# Patient Record
Sex: MALE | Race: WHITE | NOT HISPANIC OR LATINO | URBAN - METROPOLITAN AREA
[De-identification: names, ages, dates, MRNs, and addresses within clinical notes are randomized per-mention and may not be internally consistent; named-entity substitution may affect disease eponyms.]

---

## 2024-11-10 ENCOUNTER — ANCILLARY PROCEDURE (OUTPATIENT)
Dept: URGENT CARE | Age: 39
End: 2024-11-10
Payer: COMMERCIAL

## 2024-11-10 ENCOUNTER — OFFICE VISIT (OUTPATIENT)
Dept: URGENT CARE | Age: 39
End: 2024-11-10
Payer: COMMERCIAL

## 2024-11-10 VITALS
DIASTOLIC BLOOD PRESSURE: 73 MMHG | TEMPERATURE: 98 F | RESPIRATION RATE: 20 BRPM | BODY MASS INDEX: 29.12 KG/M2 | OXYGEN SATURATION: 98 % | WEIGHT: 215 LBS | SYSTOLIC BLOOD PRESSURE: 117 MMHG | HEIGHT: 72 IN | HEART RATE: 93 BPM

## 2024-11-10 DIAGNOSIS — M25.562 ACUTE PAIN OF LEFT KNEE: Primary | ICD-10-CM

## 2024-11-10 DIAGNOSIS — M25.562 ACUTE PAIN OF LEFT KNEE: ICD-10-CM

## 2024-11-10 PROCEDURE — 73562 X-RAY EXAM OF KNEE 3: CPT | Mod: LEFT SIDE

## 2024-11-10 RX ORDER — CEPHALEXIN 500 MG/1
500 CAPSULE ORAL 2 TIMES DAILY
Qty: 14 CAPSULE | Refills: 0 | Status: SHIPPED | OUTPATIENT
Start: 2024-11-10 | End: 2024-11-17

## 2024-11-10 ASSESSMENT — ENCOUNTER SYMPTOMS
ARTHRALGIAS: 1
COLOR CHANGE: 1
JOINT SWELLING: 1

## 2024-11-10 NOTE — PROGRESS NOTES
Subjective   Patient ID: Omar Cervantes is a 39 y.o. male. They present today with a chief complaint of Knee Pain (Left knee pain, slipped last night and since having pain and limited rom and unable to bear weight ).    History of Present Illness  Patient reports was at a wedding last night and fell on left knee, reports swelling and tenderness.   Denies fever or chills.          Past Medical History  Allergies as of 11/10/2024    (No Known Allergies)       (Not in a hospital admission)       No past medical history on file.    No past surgical history on file.         Review of Systems  Review of Systems   Musculoskeletal:  Positive for arthralgias and joint swelling.   Skin:  Positive for color change.   All other systems reviewed and are negative.                                 Objective    Vitals:    11/10/24 1058   BP: 117/73   BP Location: Left arm   Patient Position: Sitting   BP Cuff Size: Adult   Pulse: 93   Resp: 20   Temp: 36.7 °C (98 °F)   TempSrc: Oral   SpO2: 98%   Weight: 97.5 kg (215 lb)   Height: 1.829 m (6')     No LMP for male patient.    Physical Exam  Vitals reviewed.   Constitutional:       Appearance: Normal appearance.   HENT:      Head: Normocephalic and atraumatic.      Nose: Nose normal.   Eyes:      Extraocular Movements: Extraocular movements intact.      Conjunctiva/sclera: Conjunctivae normal.      Pupils: Pupils are equal, round, and reactive to light.   Cardiovascular:      Rate and Rhythm: Normal rate and regular rhythm.      Pulses: Normal pulses.      Heart sounds: Normal heart sounds.   Pulmonary:      Effort: Pulmonary effort is normal.      Breath sounds: Normal breath sounds.   Musculoskeletal:         General: Swelling, tenderness and signs of injury present.      Cervical back: Normal range of motion.   Skin:     General: Skin is warm.      Capillary Refill: Capillary refill takes less than 2 seconds.      Findings: Erythema present.   Neurological:      General: No  focal deficit present.      Mental Status: He is alert and oriented to person, place, and time.   Psychiatric:         Mood and Affect: Mood normal.         Behavior: Behavior normal.         Procedures    Point of Care Test & Imaging Results from this visit  No results found for this visit on 11/10/24.   No results found.    Diagnostic study results (if any) were reviewed by KASHIF Andersen.    Assessment/Plan   Allergies, medications, history, and pertinent labs/EKGs/Imaging reviewed by KASHIF Andersen.     Medical Decision Making  Patient in NAD, VSS, HRR, lungs clear.  Fell on dance floor at wedding, injured left knee, on exam left knee swelling ,erythema, abrasion.  Denies LOC or hitting head.    Left knee xray negative per radiology.  Patient to follow up with ortho, if symptoms worsen go to ED, start keflex for abrasion, patient agrees with plan of care.        Orders and Diagnoses  Diagnoses and all orders for this visit:  Acute pain of left knee  -     XR knee left 3 views; Future      Medical Admin Record      Patient disposition: Home    Electronically signed by KASHIF Andersen  11:25 AM

## 2024-11-11 ENCOUNTER — APPOINTMENT (OUTPATIENT)
Dept: ORTHOPEDIC SURGERY | Facility: CLINIC | Age: 39
End: 2024-11-11
Payer: COMMERCIAL

## 2025-05-20 ENCOUNTER — APPOINTMENT (OUTPATIENT)
Dept: OTOLARYNGOLOGY | Facility: CLINIC | Age: 40
End: 2025-05-20
Payer: COMMERCIAL